# Patient Record
Sex: FEMALE | Race: WHITE | URBAN - METROPOLITAN AREA
[De-identification: names, ages, dates, MRNs, and addresses within clinical notes are randomized per-mention and may not be internally consistent; named-entity substitution may affect disease eponyms.]

---

## 2024-08-02 ENCOUNTER — HOSPITAL ENCOUNTER (EMERGENCY)
Age: 54
Discharge: HOME | End: 2024-08-03

## 2024-08-02 VITALS
WEIGHT: 132 LBS | SYSTOLIC BLOOD PRESSURE: 121 MMHG | BODY MASS INDEX: 22.53 KG/M2 | RESPIRATION RATE: 20 BRPM | TEMPERATURE: 98.3 F | HEART RATE: 80 BPM | HEIGHT: 64 IN | OXYGEN SATURATION: 99 % | DIASTOLIC BLOOD PRESSURE: 80 MMHG

## 2024-08-02 DIAGNOSIS — J06.9 ACUTE UPPER RESPIRATORY INFECTION: Primary | ICD-10-CM

## 2024-08-02 LAB
FLUAV RNA SPEC QL NAA+PROBE: NOT DETECTED
FLUBV RNA SPEC QL NAA+PROBE: NOT DETECTED
SARS-COV-2 RDRP RESP QL NAA+PROBE: NOT DETECTED
SOURCE: NORMAL
STREP, MOLECULAR: NOT DETECTED

## 2024-08-02 PROCEDURE — 87635 SARS-COV-2 COVID-19 AMP PRB: CPT

## 2024-08-02 PROCEDURE — 99283 EMERGENCY DEPT VISIT LOW MDM: CPT

## 2024-08-02 PROCEDURE — 87502 INFLUENZA DNA AMP PROBE: CPT

## 2024-08-02 PROCEDURE — 87651 STREP A DNA AMP PROBE: CPT

## 2024-08-02 ASSESSMENT — LIFESTYLE VARIABLES
HOW MANY STANDARD DRINKS CONTAINING ALCOHOL DO YOU HAVE ON A TYPICAL DAY: PATIENT DOES NOT DRINK
HOW OFTEN DO YOU HAVE A DRINK CONTAINING ALCOHOL: NEVER
HOW OFTEN DO YOU HAVE A DRINK CONTAINING ALCOHOL: NEVER

## 2024-08-03 RX ORDER — BENZONATATE 100 MG/1
100 CAPSULE ORAL 2 TIMES DAILY PRN
Qty: 14 CAPSULE | Refills: 0 | Status: SHIPPED | OUTPATIENT
Start: 2024-08-03 | End: 2024-08-10

## 2024-08-03 RX ORDER — METHYLPREDNISOLONE 4 MG/1
TABLET ORAL
Qty: 1 KIT | Refills: 0 | Status: SHIPPED | OUTPATIENT
Start: 2024-08-03 | End: 2024-08-09

## 2024-08-03 NOTE — ED PROVIDER NOTES
Emergency Department Provider Note       PCP: No primary care provider on file.   Age: 54 y.o.   Sex: female     DISPOSITION Decision To Discharge 08/03/2024 12:10:13 AM       ICD-10-CM    1. Acute upper respiratory infection  J06.9           Medical Decision Making     Patient is a 54-year-old female presenting with symptoms consistent with upper respiratory infection.  She is afebrile nontoxic in appearance, vital signs within normal limits.  Lungs clear to auscultation.  COVID flu and strep test all negative.  Had lengthy discussion with patient that symptoms are likely viral and I do not see any reason for antibiotics she is very displeased that she is not being sent home with any antibiotics at this time.  Patient was provided with reassurance that the symptoms likely get better with time and supportive care.  I will send her home with Tessalon Perles and give Medrol Dosepak.  Discussed follow-up as well as reasons to return to the ED.  Patient agreeable to plan.     1 acute, uncomplicated illness or injury.  Prescription drug management performed.  Shared medical decision making was utilized in creating the patients health plan today.    I independently ordered and reviewed each unique test.                     History     Patient is a 54-year-old female who presents with complaint of nasal congestion sore throat and cough.  Symptoms ongoing for the last 3 days.  No fevers or chills.  Symptoms started with sore throat that is feeling improved however still scratchy.  She states she has a lot of congestion in her nose makes it difficult for her to breathe at night.  Cough is persistent but nonproductive.  No shortness of breath or difficulty breathing.  She did try some over-the-counter remedies but is not feeling any improvement and was hoping to get antibiotics.      The history is provided by the patient.     Physical Exam     Vitals signs and nursing note reviewed:  Vitals:    08/02/24 2248   BP: 121/80  R-0Print      benzonatate (TESSALON) 100 MG capsule Take 1 capsule by mouth 2 times daily as needed for Cough, Disp-14 capsule, R-0Print              No past medical history on file.     No past surgical history on file.     Social History     Socioeconomic History    Marital status: Unknown        Discharge Medication List as of 8/3/2024 12:13 AM           Results for orders placed or performed during the hospital encounter of 08/02/24   COVID-19, Rapid    Specimen: Nasopharyngeal   Result Value Ref Range    Source Nasopharyngeal      SARS-CoV-2, Rapid Not detected NOTD     Influenza A/B, Molecular    Specimen: Nasopharyngeal   Result Value Ref Range    Influenza A, STEVEN Not detected NOTD      Influenza B, STEVEN Not detected NOTD     Group A Strep Screen By PCR    Specimen: Throat   Result Value Ref Range    Strep, Molecular Not detected           No orders to display                Recent Labs     08/02/24  2255   COVID19 Not detected        Voice dictation software was used during the making of this note.  This software is not perfect and grammatical and other typographical errors may be present.  This note has not been completely proofread for errors.     Ivelisse Kidd PA  08/03/24 0055

## 2024-08-03 NOTE — DISCHARGE INSTR - COC
Continuity of Care Form    Patient Name: Lydia Zuniga   :  1970  MRN:  [id number]    Admit date:  2024  Discharge date:  ***    Code Status Order: No Order   Advance Directives:     Admitting Physician:  No admitting provider for patient encounter.  PCP: No primary care provider on file.    Discharging Nurse: ***  Discharging Hospital Unit/Room#: RPA5/RP5  Discharging Unit Phone Number: ***    Emergency Contact:   No emergency contact information on file.    Past Surgical History:  No past surgical history on file.    Immunization History:     There is no immunization history on file for this patient.    Active Problems:  There is no problem list on file for this patient.      Isolation/Infection:   Isolation            No Isolation          Patient Infection Status       None to display                     Nurse Assessment:  Last Vital Signs: /80   Pulse 80   Temp 98.3 °F (36.8 °C) (Oral)   Resp 20   Ht 1.626 m (5' 4\")   Wt 59.9 kg (132 lb)   SpO2 99%   BMI 22.66 kg/m²     Last documented pain score (0-10 scale):    Last Weight:   Wt Readings from Last 1 Encounters:   24 59.9 kg (132 lb)     Mental Status:  {IP PT MENTAL STATUS:}    IV Access:  { BETY IV ACCESS:716128602}    Nursing Mobility/ADLs:  Walking   {CHP DME ADLs:600253359}  Transfer  {CHP DME ADLs:528432725}  Bathing  {CHP DME ADLs:169922615}  Dressing  {CHP DME ADLs:844472554}  Toileting  {CHP DME ADLs:654127611}  Feeding  {CHP DME ADLs:332525973}  Med Admin  {CHP DME ADLs:023238439}  Med Delivery   { BETY MED Delivery:844596546}    Wound Care Documentation and Therapy:        Elimination:  Continence:   Bowel: {YES / NO:}  Bladder: {YES / NO:}  Urinary Catheter: {Urinary Catheter:761106601}   Colostomy/Ileostomy/Ileal Conduit: {YES / NO:}       Date of Last BM: ***  No intake or output data in the 24 hours ending 24 0025  No intake/output data recorded.    Safety Concerns:     { BETY Safety

## 2024-08-03 NOTE — DISCHARGE INSTRUCTIONS
Your COVID test, influenza test and strep test were all negative.  You have a viral upper respiratory infection.  The symptoms can take 5 to 7 days to completely resolve.  Recommend supportive care with rest and fluids I am sending you home with Tessalon Perles which are cough suppressants that she can take 2 times daily. Also included a medrol dose pack for congestion, pleae take as package instructs. Follow up with your doctor for any further evaluation.